# Patient Record
Sex: FEMALE | Race: WHITE | NOT HISPANIC OR LATINO | URBAN - METROPOLITAN AREA
[De-identification: names, ages, dates, MRNs, and addresses within clinical notes are randomized per-mention and may not be internally consistent; named-entity substitution may affect disease eponyms.]

---

## 2017-07-01 ENCOUNTER — OUTPATIENT (OUTPATIENT)
Dept: OUTPATIENT SERVICES | Facility: HOSPITAL | Age: 62
LOS: 1 days | End: 2017-07-01
Payer: MEDICAID

## 2017-07-14 DIAGNOSIS — R69 ILLNESS, UNSPECIFIED: ICD-10-CM

## 2017-09-01 PROCEDURE — G9001: CPT

## 2022-01-28 ENCOUNTER — EMERGENCY (EMERGENCY)
Facility: HOSPITAL | Age: 67
LOS: 1 days | Discharge: HOME | End: 2022-01-28
Attending: EMERGENCY MEDICINE | Admitting: EMERGENCY MEDICINE
Payer: MEDICARE

## 2022-01-28 VITALS
DIASTOLIC BLOOD PRESSURE: 99 MMHG | HEIGHT: 64 IN | WEIGHT: 149.91 LBS | SYSTOLIC BLOOD PRESSURE: 145 MMHG | RESPIRATION RATE: 18 BRPM | TEMPERATURE: 97 F | OXYGEN SATURATION: 97 % | HEART RATE: 92 BPM

## 2022-01-28 VITALS
DIASTOLIC BLOOD PRESSURE: 93 MMHG | SYSTOLIC BLOOD PRESSURE: 148 MMHG | RESPIRATION RATE: 16 BRPM | HEART RATE: 84 BPM | OXYGEN SATURATION: 97 %

## 2022-01-28 LAB
ALBUMIN SERPL ELPH-MCNC: 3.1 G/DL — LOW (ref 3.3–5)
ALP SERPL-CCNC: 89 U/L — SIGNIFICANT CHANGE UP (ref 40–120)
ALT FLD-CCNC: 25 U/L — SIGNIFICANT CHANGE UP (ref 10–45)
ANION GAP SERPL CALC-SCNC: 4 MMOL/L — LOW (ref 5–17)
AST SERPL-CCNC: 20 U/L — SIGNIFICANT CHANGE UP (ref 10–40)
BASOPHILS # BLD AUTO: 0.04 K/UL — SIGNIFICANT CHANGE UP (ref 0–0.2)
BASOPHILS NFR BLD AUTO: 0.7 % — SIGNIFICANT CHANGE UP (ref 0–2)
BILIRUB SERPL-MCNC: 0.2 MG/DL — SIGNIFICANT CHANGE UP (ref 0.2–1.2)
BUN SERPL-MCNC: 15 MG/DL — SIGNIFICANT CHANGE UP (ref 7–23)
CALCIUM SERPL-MCNC: 9.2 MG/DL — SIGNIFICANT CHANGE UP (ref 8.4–10.5)
CHLORIDE SERPL-SCNC: 107 MMOL/L — SIGNIFICANT CHANGE UP (ref 96–108)
CO2 SERPL-SCNC: 28 MMOL/L — SIGNIFICANT CHANGE UP (ref 22–31)
CREAT SERPL-MCNC: 0.62 MG/DL — SIGNIFICANT CHANGE UP (ref 0.5–1.3)
EOSINOPHIL # BLD AUTO: 0.17 K/UL — SIGNIFICANT CHANGE UP (ref 0–0.5)
EOSINOPHIL NFR BLD AUTO: 2.9 % — SIGNIFICANT CHANGE UP (ref 0–6)
ETHANOL SERPL-MCNC: <3 MG/DL — SIGNIFICANT CHANGE UP (ref 0–3)
GLUCOSE SERPL-MCNC: 109 MG/DL — HIGH (ref 70–99)
HCT VFR BLD CALC: 35.1 % — SIGNIFICANT CHANGE UP (ref 34.5–45)
HGB BLD-MCNC: 11.5 G/DL — SIGNIFICANT CHANGE UP (ref 11.5–15.5)
IMM GRANULOCYTES NFR BLD AUTO: 0.2 % — SIGNIFICANT CHANGE UP (ref 0–1.5)
LYMPHOCYTES # BLD AUTO: 1.34 K/UL — SIGNIFICANT CHANGE UP (ref 1–3.3)
LYMPHOCYTES # BLD AUTO: 23.2 % — SIGNIFICANT CHANGE UP (ref 13–44)
MCHC RBC-ENTMCNC: 27.6 PG — SIGNIFICANT CHANGE UP (ref 27–34)
MCHC RBC-ENTMCNC: 32.8 GM/DL — SIGNIFICANT CHANGE UP (ref 32–36)
MCV RBC AUTO: 84.2 FL — SIGNIFICANT CHANGE UP (ref 80–100)
MONOCYTES # BLD AUTO: 0.56 K/UL — SIGNIFICANT CHANGE UP (ref 0–0.9)
MONOCYTES NFR BLD AUTO: 9.7 % — SIGNIFICANT CHANGE UP (ref 2–14)
NEUTROPHILS # BLD AUTO: 3.66 K/UL — SIGNIFICANT CHANGE UP (ref 1.8–7.4)
NEUTROPHILS NFR BLD AUTO: 63.3 % — SIGNIFICANT CHANGE UP (ref 43–77)
NRBC # BLD: 0 /100 WBCS — SIGNIFICANT CHANGE UP (ref 0–0)
PLATELET # BLD AUTO: 303 K/UL — SIGNIFICANT CHANGE UP (ref 150–400)
POTASSIUM SERPL-MCNC: 4.1 MMOL/L — SIGNIFICANT CHANGE UP (ref 3.5–5.3)
POTASSIUM SERPL-SCNC: 4.1 MMOL/L — SIGNIFICANT CHANGE UP (ref 3.5–5.3)
PROT SERPL-MCNC: 6.1 G/DL — SIGNIFICANT CHANGE UP (ref 6–8.3)
RBC # BLD: 4.17 M/UL — SIGNIFICANT CHANGE UP (ref 3.8–5.2)
RBC # FLD: 14.1 % — SIGNIFICANT CHANGE UP (ref 10.3–14.5)
SARS-COV-2 RNA SPEC QL NAA+PROBE: SIGNIFICANT CHANGE UP
SODIUM SERPL-SCNC: 139 MMOL/L — SIGNIFICANT CHANGE UP (ref 135–145)
WBC # BLD: 5.78 K/UL — SIGNIFICANT CHANGE UP (ref 3.8–10.5)
WBC # FLD AUTO: 5.78 K/UL — SIGNIFICANT CHANGE UP (ref 3.8–10.5)

## 2022-01-28 PROCEDURE — 87635 SARS-COV-2 COVID-19 AMP PRB: CPT

## 2022-01-28 PROCEDURE — 80307 DRUG TEST PRSMV CHEM ANLYZR: CPT

## 2022-01-28 PROCEDURE — 99053 MED SERV 10PM-8AM 24 HR FAC: CPT

## 2022-01-28 PROCEDURE — 80053 COMPREHEN METABOLIC PANEL: CPT

## 2022-01-28 PROCEDURE — 99284 EMERGENCY DEPT VISIT MOD MDM: CPT

## 2022-01-28 PROCEDURE — 85025 COMPLETE CBC W/AUTO DIFF WBC: CPT

## 2022-01-28 PROCEDURE — 36415 COLL VENOUS BLD VENIPUNCTURE: CPT

## 2022-01-28 PROCEDURE — 99285 EMERGENCY DEPT VISIT HI MDM: CPT
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
## 2022-01-28 NOTE — ED ADULT NURSE NOTE - NSIMPLEMENTINTERV_GEN_ALL_ED
Implemented All Fall with Harm Risk Interventions:  [place] to call system. Call bell, personal items and telephone within reach. Instruct patient to call for assistance. Room bathroom lighting operational. Non-slip footwear when patient is off stretcher. Physically safe environment: no spills, clutter or unnecessary equipment. Stretcher in lowest position, wheels locked, appropriate side rails in place. Provide visual cue, wrist band, yellow gown, etc. Monitor gait and stability. Monitor for mental status changes and reorient to person, place, and time. Review medications for side effects contributing to fall risk. Reinforce activity limits and safety measures with patient and family. Provide visual clues: red socks.

## 2022-01-28 NOTE — ED ADULT NURSE REASSESSMENT NOTE - NS ED NURSE REASSESS COMMENT FT1
Patient BIBEMS for roaming around in the street with suitcase and belongings. Patient stated name was Kaia on arrival. Patient refusing to cooperate with staff and refusing answer staffs questions. Patient noted to be speaking quickly and erratically switching from past to present situations and stories. Patient denies visual/auditory hallucinations. Patient denies SI/HI.

## 2022-01-28 NOTE — ED PROVIDER NOTE - PATIENT PORTAL LINK FT
You can access the FollowMyHealth Patient Portal offered by [facility] by registering at the following website: [email or web address]. By joining [employer]’s FollowMyHealth portal, you will also be able to view your health information using other applications (apps) compatible with our system.

## 2022-01-28 NOTE — ED PROVIDER NOTE - OBJECTIVE STATEMENT
67 y/o F with no know history BIB police found wondering in parking lot , police complaints she felt confused , pt denies any alcohol use, denies any S/H ideation, c/o she is homeless and has no place to go

## 2022-01-28 NOTE — ED ADULT NURSE NOTE - OBJECTIVE STATEMENT
Patient BIBEMS for roaming in the street. As per EMS "patient was walking in the street with suitcase and belongings attempting to run away from family, confused and delusional". Patient noncooperative when asking orientation questions and refusing care.

## 2022-01-28 NOTE — ED PROVIDER NOTE - NSFOLLOWUPCLINICS_GEN_ALL_ED_FT
Family Practice Clinic  Family Medicine  [address] [place] [address]  Phone: [phone]  Fax:

## 2022-01-28 NOTE — ED ADULT NURSE REASSESSMENT NOTE - NS ED NURSE REASSESS COMMENT FT1
patient resting comfortably denies chest pain, SOB headache and dizziness. Patient still refusing IV. Will continue to monitor.

## 2022-01-28 NOTE — ED ADULT TRIAGE NOTE - DOMESTIC TRAVEL HIGH RISK QUESTION
CHIEF COMPLAINT: "I have fluid on my lungs"    HPI:  69 F with extensive stage SCLC (dx 2014, currently on tagrisso), sarcoidosis (she is unsure how she was diagnosed, but is on steroids for it, she thinks), h/o left pleural effusion s/p thoracentesis at [facility] in March, sent from [facility] rehab for "a large right pleural effusion."     The story is a little confusing as the patient is a little unclear with her history.  She states she was diagnosed with SCLC about four years ago and is followed by Dr. Estrella at [facility].  She is currently on "last stage" treatment due to her disease continuing to progress  Last month, she went to [facility] for weakness, tachycardia, and general medical workup, per her.  There, she was found to have a left pleural effusion that was drained.  She thinks it was malignant.  She was sent to a rehab facility ([facility]).  It's unclear why they checked imaging, but she had a cxr done that showed an effusion (unclear which side), so she was sent to the ER. She denies any sob or dyspnea.  Her main complaint was that she was constipated and felt bloated, which has improved now.  She can walk around the room without any difficulty.  She never complained of dyspnea.  No fevers, chills, hemoptysis, cough, wheezing.        PAST MEDICAL & SURGICAL HISTORY:  Sarcoidosis  Lung cancer  No significant past surgical history      FAMILY HISTORY:  Family history of thyroid cancer (Mother)  Family history of muscular dystrophy (Father)  Family history of Kaposi's sarcoma (Sibling)      SOCIAL HISTORY:  Smoking: former smoker      Allergies    No Known Allergies    Intolerances        HOME MEDICATIONS:  Home Medications:  cyanocobalamin 100 mcg oral tablet: 1 tab(s) orally once a day (27 Apr 2018 08:43)  famotidine 20 mg oral tablet: 1 tab(s) orally once a day (27 Apr 2018 08:43)  folic acid 1 mg oral tablet: 1 tab(s) orally once a day (27 Apr 2018 08:43)  Incruse Ellipta 62.5 mcg/inh inhalation powder: 1 puff(s) inhaled once a day (27 Apr 2018 08:43)  Lasix 20 mg oral tablet: 1 tab(s) orally once a day (27 Apr 2018 08:43)  Multiple Vitamins oral capsule: 1 cap(s) orally once a day (27 Apr 2018 08:43)  nystatin 100,000 units/g topical cream (obsolete): Apply topically to affected area 2 times a day (27 Apr 2018 08:43)  nystatin 100,000 units/g topical powder: Apply topically to affected area 2 times a day to b/l feet (27 Apr 2018 08:43)  predniSONE 10 mg oral tablet: 1 tab(s) orally once a day (27 Apr 2018 08:43)  Tagrisso 80 mg oral tablet: 1 tab(s) orally once a day (27 Apr 2018 08:43)  Tessalon 200 mg oral capsule: 1 cap(s) orally 3 times a day (27 Apr 2018 08:43)  Triple Paste topical ointment: Apply topically to affected area once a day (27 Apr 2018 08:43)  Tylenol 325 mg oral tablet: 2 tab(s) orally every 4 hours, As Needed (27 Apr 2018 08:43)  Vitamin C 500 mg oral tablet: 1 tab(s) orally once a day (27 Apr 2018 08:43)  zinc sulfate 220 mg oral tablet: 1 tab(s) orally once a day (27 Apr 2018 08:43)      REVIEW OF SYSTEMS:  Constitutional: [ x] negative [ ] fevers [ ] chills [ ] weight loss [ ] weight gain  HEENT: [x ] negative [ ] dry eyes [ ] eye irritation [ ] postnasal drip [ ] nasal congestion  CV: [x ] negative  [ ] chest pain [ ] orthopnea [ ] palpitations [ ] murmur  Resp: [ x] negative [ ] cough [ ] shortness of breath [ ] dyspnea [ ] wheezing [ ] sputum [ ] hemoptysis  GI: [x ] negative [ ] nausea [ ] vomiting [ ] diarrhea [ ] constipation [ ] abd pain [ ] dysphagia   : [ x] negative [ ] dysuria [ ] nocturia [ ] hematuria [ ] increased urinary frequency  Musculoskeletal: [x ] negative [ ] back pain [ ] myalgias [ ] arthralgias [ ] fracture  Skin: [x ] negative [ ] rash [ ] itch  Neurological: [ x] negative [ ] headache [ ] dizziness [ ] syncope [ ] weakness [ ] numbness  Psychiatric: [x ] negative [ ] anxiety [ ] depression  Endocrine: [x ] negative [ ] diabetes [ ] thyroid problem  Hematologic/Lymphatic: [x ] negative [ ] anemia [ ] bleeding problem  Allergic/Immunologic: [x ] negative [ ] itchy eyes [ ] nasal discharge [ ] hives [ ] angioedema  [ ] All other systems negative  [ ] Unable to assess ROS because ________    OBJECTIVE:  ICU Vital Signs Last 24 Hrs  T(C): 36.8 (27 Apr 2018 05:01), Max: 37.7 (26 Apr 2018 17:15)  T(F): 98.2 (27 Apr 2018 05:01), Max: 99.9 (26 Apr 2018 17:15)  HR: 97 (27 Apr 2018 05:01) (96 - 113)  BP: 105/71 (27 Apr 2018 05:01) (105/71 - 131/89)  BP(mean): --  ABP: --  ABP(mean): --  RR: 18 (27 Apr 2018 05:01) (18 - 24)  SpO2: 97% (27 Apr 2018 05:01) (94% - 97%) on RA        CAPILLARY BLOOD GLUCOSE          PHYSICAL EXAM:  GENERAL: NAD, well-developed, talking in full sentences  HEENT:  Atraumatic, Normocephalic  EYES: EOMI, PERRLA, conjunctiva and sclera clear  NECK: Supple, No JVD  CHEST/LUNG: minimally decreased breath sounds at bases bilaterally, else clear  HEART: Regular rate and rhythm; No murmurs, rubs, or gallops  ABDOMEN: Soft, Nontender, Nondistended; Bowel sounds present  EXTREMITIES:  2+ Peripheral Pulses, No clubbing, cyanosis, or edema  PSYCH: AAOx3  NEUROLOGY: non-focal exam  SKIN: No rashes or lesions    HOSPITAL MEDICATIONS:  Standing Meds:  ascorbic acid 500 milliGRAM(s) Oral daily  clotrimazole 1% Cream 1 Application(s) Topical two times a day  cyanocobalamin 100 MICROGram(s) Oral daily  famotidine    Tablet 20 milliGRAM(s) Oral daily  folic acid 1 milliGRAM(s) Oral daily  furosemide    Tablet 20 milliGRAM(s) Oral daily  multivitamin 1 Tablet(s) Oral daily  predniSONE   Tablet 10 milliGRAM(s) Oral daily  tiotropium 18 MICROgram(s) Capsule 1 Capsule(s) Inhalation daily  zinc sulfate 220 milliGRAM(s) Oral daily      PRN Meds:  benzonatate 100 milliGRAM(s) Oral three times a day PRN      LABS:                        15.2   5.68  )-----------( 217      ( 26 Apr 2018 17:24 )             46.6     Hgb Trend: 15.2<--  04-26    137  |  96<L>  |  12  ----------------------------<  130<H>  4.0   |  29  |  0.50    Ca    9.4      26 Apr 2018 17:24    TPro  7.0  /  Alb  3.2<L>  /  TBili  0.7  /  DBili  x   /  AST  21  /  ALT  20  /  AlkPhos  160<H>  04-26    Creatinine Trend: 0.50<--  PT/INR - ( 26 Apr 2018 17:24 )   PT: 12.2 SEC;   INR: 1.06          PTT - ( 26 Apr 2018 17:24 )  PTT:34.5 SEC      Venous Blood Gas:  04-26 @ 17:24  7.41/54/41/31/74.0  VBG Lactate: 2.0      MICROBIOLOGY:       RADIOLOGY:  [ x] Reviewed and interpreted by me  cta chest 4/26/18: no segmental PE; small-moderate R pleural effusion, small left pleural effusions, compressive atelectasis; narrowing of left bronchi with L hilar opacity    PULMONARY FUNCTION TESTS:    EKG: No

## 2022-01-28 NOTE — ED ADULT NURSE REASSESSMENT NOTE - NS ED NURSE REASSESS COMMENT FT1
patient calm and cooperative. VSS. some disorganized thinking. denies thoughts to harm herself or others. requesting assistance to get into a shelter

## 2022-02-01 ENCOUNTER — EMERGENCY (EMERGENCY)
Facility: HOSPITAL | Age: 67
LOS: 1 days | Discharge: HOME | End: 2022-02-01
Attending: EMERGENCY MEDICINE | Admitting: EMERGENCY MEDICINE
Payer: SELF-PAY

## 2022-02-01 VITALS
TEMPERATURE: 97 F | RESPIRATION RATE: 18 BRPM | OXYGEN SATURATION: 98 % | HEART RATE: 89 BPM | DIASTOLIC BLOOD PRESSURE: 75 MMHG | SYSTOLIC BLOOD PRESSURE: 145 MMHG | WEIGHT: 199.96 LBS | HEIGHT: 64 IN

## 2022-02-01 VITALS
OXYGEN SATURATION: 98 % | TEMPERATURE: 99 F | DIASTOLIC BLOOD PRESSURE: 75 MMHG | RESPIRATION RATE: 18 BRPM | HEART RATE: 73 BPM | SYSTOLIC BLOOD PRESSURE: 121 MMHG

## 2022-02-01 PROCEDURE — 99053 MED SERV 10PM-8AM 24 HR FAC: CPT

## 2022-02-01 PROCEDURE — 99282 EMERGENCY DEPT VISIT SF MDM: CPT

## 2022-02-01 PROCEDURE — 99283 EMERGENCY DEPT VISIT LOW MDM: CPT

## 2022-02-01 NOTE — ED PROVIDER NOTE - NSFOLLOWUPINSTRUCTIONS_ED_ALL_ED_FT
Follow Up as needed with your own doctor or with  Family Practice Clinic  Family Medicine  [address] [place] [address]  Phone: [phone]    contact DSS () for shelter and further assistance  General Information: [phone]    After-Hours Services: [phone]

## 2022-02-01 NOTE — ED PROVIDER NOTE - CLINICAL SUMMARY MEDICAL DECISION MAKING FREE TEXT BOX
pt bibems for sleeping at [facility]. pt requesting shelter for the night. denies any complaints. benign exam. already seen by SW 2 d ago.  will give shelter in ED overnight pt bibems for sleeping at [facility]. pt requesting shelter for the night. denies any complaints. benign exam. already seen by SW 2 d ago.  will give shelter in ED overnight    Pt did not want to go to shelter. Will d/c = patient will be given info for .

## 2022-02-01 NOTE — ED ADULT NURSE NOTE - OBJECTIVE STATEMENT
Pr brought in by EMS. EMS states that patient was found sleeping in one of the aisles at [facility]. EMS states they brought her to ED due to lack of other place to bring her. Pt is verbal, but states "my name is Lisbeth." Inappropriate speech. Pt does not appear to be in pain at this time. Pt cooperates with directions for the most part. EMS believes patient to be homeless.

## 2022-02-01 NOTE — ED PROVIDER NOTE - OBJECTIVE STATEMENT
pt bibems found sleeping at [facility]. pt states she is homeless and just wants a place to sleep. denies medical complaints. no si/hi/hallucinations. was seen here for same 1/28. was seen by SW and given ride to [facility] per pt request

## 2022-02-01 NOTE — ED ADULT NURSE NOTE - NSIMPLEMENTINTERV_GEN_ALL_ED
Implemented All Fall Risk Interventions:  [place] to call system. Call bell, personal items and telephone within reach. Instruct patient to call for assistance. Room bathroom lighting operational. Non-slip footwear when patient is off stretcher. Physically safe environment: no spills, clutter or unnecessary equipment. Stretcher in lowest position, wheels locked, appropriate side rails in place. Provide visual cue, wrist band, yellow gown, etc. Monitor gait and stability. Monitor for mental status changes and reorient to person, place, and time. Review medications for side effects contributing to fall risk. Reinforce activity limits and safety measures with patient and family.

## 2022-02-01 NOTE — ED ADULT TRIAGE NOTE - CHIEF COMPLAINT QUOTE
We found her outside the [facility], she is homeless" as per EMS We found her outside the [facility], she is homeless" as per EMS (ISAR Negative)

## 2022-02-01 NOTE — ED PROVIDER NOTE - PATIENT PORTAL LINK FT
You can access the FollowMyHealth Patient Portal offered by [facility] by registering at the following website: [email or web address]. By joining [employer]’s FollowMyHealth portal, you will also be able to view your health information using other applications (apps) compatible with our system.

## 2022-02-01 NOTE — ED ADULT NURSE REASSESSMENT NOTE - NS ED NURSE REASSESS COMMENT FT1
Unable to complete med list, med hx, or surg hx. When patient is asked about her health information, she responds with inappropriate words; for example, "my name is Sabattus. you need a sandwich? you can have my sandwich."

## 2022-02-05 ENCOUNTER — EMERGENCY (EMERGENCY)
Facility: HOSPITAL | Age: 67
LOS: 1 days | Discharge: HOME | End: 2022-02-05
Attending: EMERGENCY MEDICINE | Admitting: EMERGENCY MEDICINE
Payer: SELF-PAY

## 2022-02-05 VITALS
HEART RATE: 85 BPM | OXYGEN SATURATION: 97 % | SYSTOLIC BLOOD PRESSURE: 132 MMHG | HEIGHT: 64 IN | RESPIRATION RATE: 18 BRPM | DIASTOLIC BLOOD PRESSURE: 74 MMHG | WEIGHT: 160.06 LBS | TEMPERATURE: 98 F

## 2022-02-05 VITALS — HEART RATE: 80 BPM | OXYGEN SATURATION: 98 % | TEMPERATURE: 99 F | RESPIRATION RATE: 17 BRPM

## 2022-02-05 PROCEDURE — 99283 EMERGENCY DEPT VISIT LOW MDM: CPT

## 2022-02-05 PROCEDURE — 99053 MED SERV 10PM-8AM 24 HR FAC: CPT

## 2022-02-05 PROCEDURE — 99285 EMERGENCY DEPT VISIT HI MDM: CPT
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
## 2022-02-05 NOTE — ED ADULT TRIAGE NOTE - CADM TRG TX PRIOR TO ARRIVAL
Patient Seen in: [facility] In [address] [place] [address]      History   Patient presents with:  Abdomen/Flank Pain    Stated Complaint: stomach pain    HPI    The patient is a 27-year-old female with past history of diabetes, hypertension who presen rebound. Neurologic: Patient is awake, alert and oriented ×3.   The patient's motor strength is 5 out of 5 and symmetric in the upper and lower extremities bilaterally  Extremities: No focal swelling or tenderness  Skin: No pallor, no redness or warmth to none

## 2022-02-05 NOTE — ED PROVIDER NOTE - NSFOLLOWUPINSTRUCTIONS_ED_ALL_ED_FT
Follow Up as needed with your own doctor or with  Family Practice Clinic  Family Medicine  [address] [place] [address]  Phone: [phone]    contact DSS (Department of ) for help, shelter:    [facility] [address] [address]    On-line Directions        General Information: [phone]    After-Hours Services: [phone]

## 2022-02-05 NOTE — ED PROVIDER NOTE - PATIENT PORTAL LINK FT
You can access the FollowMyHealth Patient Portal offered by [facility] by registering at the following website: [email or web address]. By joining [employer]’s FollowMyHealth portal, you will also be able to view your health information using other applications (apps) compatible with our system.

## 2022-02-05 NOTE — ED ADULT NURSE REASSESSMENT NOTE - NS ED NURSE REASSESS COMMENT FT1
Covering RN; as per primary RN Sadi, pt to wait for social work.  Pt refusing social work follow up at this time.  Pt signed discharge paperwork and walked out of ED with all belongings, stating that "I might wait in the waiting room."  Primary RN notified.

## 2022-02-05 NOTE — ED ADULT NURSE NOTE - OBJECTIVE STATEMENT
Patient presented to ED after being found wandering around [facility], police picked up and brought her to the ED. Patient homeless. No complaints of SOB, discomfort or pain. Alert and oriented to person, place and situation.

## 2022-02-05 NOTE — ED ADULT NURSE REASSESSMENT NOTE - NS ED NURSE REASSESS COMMENT FT1
Patient slept well through the night, no signs or symptoms of SOB, discomfort or pain. Alert and oriented to person, place and situation.
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
## 2022-02-05 NOTE — ED PROVIDER NOTE - CONSTITUTIONAL, MLM
normal... Well appearing, awake, alert, oriented to person, place, time/situation and in no apparent distress. disheveled

## 2022-02-10 ENCOUNTER — EMERGENCY (EMERGENCY)
Facility: HOSPITAL | Age: 67
LOS: 1 days | Discharge: HOME | End: 2022-02-10
Attending: EMERGENCY MEDICINE | Admitting: EMERGENCY MEDICINE
Payer: SELF-PAY

## 2022-02-10 VITALS
TEMPERATURE: 97 F | SYSTOLIC BLOOD PRESSURE: 144 MMHG | WEIGHT: 119.93 LBS | HEART RATE: 85 BPM | DIASTOLIC BLOOD PRESSURE: 86 MMHG | RESPIRATION RATE: 18 BRPM | HEIGHT: 64 IN | OXYGEN SATURATION: 97 %

## 2022-02-10 PROCEDURE — 99053 MED SERV 10PM-8AM 24 HR FAC: CPT

## 2022-02-10 PROCEDURE — 99282 EMERGENCY DEPT VISIT SF MDM: CPT

## 2022-02-10 PROCEDURE — 99283 EMERGENCY DEPT VISIT LOW MDM: CPT

## 2022-02-11 VITALS
SYSTOLIC BLOOD PRESSURE: 155 MMHG | RESPIRATION RATE: 18 BRPM | TEMPERATURE: 98 F | HEART RATE: 70 BPM | OXYGEN SATURATION: 99 % | DIASTOLIC BLOOD PRESSURE: 78 MMHG

## 2022-02-11 NOTE — ED ADULT NURSE REASSESSMENT NOTE - NS ED NURSE REASSESS COMMENT FT1
Received pt in bed, awake alert oriented. Awaiting breakfast. OOB to BR. Discussed social work consultation and placement in Housing vs. Group home.

## 2022-02-11 NOTE — ED PROVIDER NOTE - CLINICAL SUMMARY MEDICAL DECISION MAKING FREE TEXT BOX
pt is elderly female who lives on street, has been to ED many times in past few days, she has been offered SW many times but she refuses to go shelter

## 2022-02-11 NOTE — ED PROVIDER NOTE - NSICDXPASTMEDICALHX_GEN_ALL_CORE_FT
PAST MEDICAL HISTORY:  CAD (coronary artery disease)      Xolair Counseling:  Patient informed of potential adverse effects including but not limited to fever, muscle aches, rash and allergic reactions.  The patient verbalized understanding of the proper use and possible adverse effects of Xolair.  All of the patient's questions and concerns were addressed.

## 2022-02-11 NOTE — ED ADULT NURSE NOTE - EXTENSIONS OF SELF_ADULT
Correct dose escribed to pharmacy.    
Current sig: Take 1 tablet three times daily       Spoke with [facility], asking if patient really should be taking it three times daily? Is it for erectile dysfunction?    
It was put in wrong  It is qday prn ED  
[facility] [place] needing to clarify the dosing  for the Sildenafil.  [phone]  
None

## 2022-02-11 NOTE — ED PROVIDER NOTE - NSFOLLOWUPINSTRUCTIONS_ED_ALL_ED_FT
Pt did not want to go to shelter. Will d/c = patient will be given info for .    Follow Up as needed with your own doctor or with  [facility]  Family Medicine  [address] [place] [address]  Phone: [phone]    contact DSS () for shelter and further assistance  General Information: [phone]    After-Hours Services: [phone]

## 2022-02-11 NOTE — ED ADULT NURSE NOTE - ED STAT RN HANDOFF DETAILS
handoff report given to Nichol ROSAS. patient slept comfortably. no other concerns/complaints offered. assisted as needed. safety maintained.

## 2022-02-11 NOTE — ED PROVIDER NOTE - PATIENT PORTAL LINK FT
- - -
You can access the FollowMyHealth Patient Portal offered by [facility] by registering at the following website: [email or web address]. By joining [employer]’s FollowMyHealth portal, you will also be able to view your health information using other applications (apps) compatible with our system.

## 2022-02-11 NOTE — ED ADULT NURSE REASSESSMENT NOTE - NS ED NURSE REASSESS COMMENT FT1
0010-received patient in stretcher BIBA from [facility]. AOX3. ambulatory. as per patient she was trying to get a ride/cab to come to the ED so she can sit in the lobby or waiting area. But the police called an ambulance and brought her here. patient talks to herself. gets irritated and upset when asked assessment questions. refused to stay on the stretcher at first. asking "how much do I have yo pay, I just want to sit outside". safety in place. nursing care ongoing.

## 2022-02-11 NOTE — ED PROVIDER NOTE - OBJECTIVE STATEMENT
67 y/o F , homeless, found wondering near [facility] , this is almost 6 th ed visit, multiple time she was given  help but then she wants to go [facility] and from there she leaves, she does not go to shelter.

## 2022-02-15 ENCOUNTER — EMERGENCY (EMERGENCY)
Facility: HOSPITAL | Age: 67
LOS: 1 days | Discharge: HOME | End: 2022-02-15
Attending: EMERGENCY MEDICINE | Admitting: EMERGENCY MEDICINE
Payer: SELF-PAY

## 2022-02-15 PROCEDURE — 99053 MED SERV 10PM-8AM 24 HR FAC: CPT

## 2022-02-15 PROCEDURE — 99283 EMERGENCY DEPT VISIT LOW MDM: CPT

## 2022-02-16 VITALS
WEIGHT: 119.93 LBS | HEIGHT: 64 IN | HEART RATE: 81 BPM | RESPIRATION RATE: 21 BRPM | OXYGEN SATURATION: 97 % | SYSTOLIC BLOOD PRESSURE: 132 MMHG | TEMPERATURE: 98 F | DIASTOLIC BLOOD PRESSURE: 93 MMHG

## 2022-02-16 PROCEDURE — 99283 EMERGENCY DEPT VISIT LOW MDM: CPT

## 2022-02-16 NOTE — ED PROVIDER NOTE - OBJECTIVE STATEMENT
67 y/o F , homeless, found wondering near [facility] , this is almost 6 th ed visit, multiple time she was given  help but then she wants to go [facility] and from there she leaves, she does not go to shelter.

## 2022-02-16 NOTE — ED PROVIDER NOTE - CLINICAL SUMMARY MEDICAL DECISION MAKING FREE TEXT BOX
pt is elderly homeless female , with frequent visits to Ed because she keep wondering near mall, she has been offered  help many times but she keeps refusing to go shelter,

## 2022-02-16 NOTE — ED ADULT NURSE NOTE - OBJECTIVE STATEMENT
pt jaz picked up at [facility]  upon arrival pt talking to herself and having difficuly answering questions she gets off track and starts talking about  issues that are not part of the current conversation

## 2022-02-16 NOTE — ED ADULT TRIAGE NOTE - HEIGHT IN INCHES
After Visit Summary   3/19/2018    Angi Ramos    MRN: [id number]           Patient Information     Date Of Birth          1959        Visit Information        Provider Department      3/19/2018 10:00 AM Jeannette Chavez NP [facility]        Today's Diagnoses     Routine general medical examination at a health care facility    -  1      Care Instructions      Preventive Health Recommendations  Female Ages 50 - 64    Yearly exam: See your health care provider every year in order to  o Review health changes.   o Discuss preventive care.    o Review your medicines if your doctor has prescribed any.      Get a Pap test every three years (unless you have an abnormal result and your provider advises testing more often).    If you get Pap tests with HPV test, you only need to test every 5 years, unless you have an abnormal result.     You do not need a Pap test if your uterus was removed (hysterectomy) and you have not had cancer.    You should be tested each year for STDs (sexually transmitted diseases) if you're at risk.     Have a mammogram every 1 to 2 years.    Have a colonoscopy at age 50, or have a yearly FIT test (stool test). These exams screen for colon cancer.      Have a cholesterol test every 5 years, or more often if advised.    Have a diabetes test (fasting glucose) every three years. If you are at risk for diabetes, you should have this test more often.     If you are at risk for osteoporosis (brittle bone disease), think about having a bone density scan (DEXA).    Shots: Get a flu shot each year. Get a tetanus shot every 10 years.    Nutrition:     Eat at least 5 servings of fruits and vegetables each day.    Eat whole-grain bread, whole-wheat pasta and brown rice instead of white grains and rice.    Talk to your provider about Calcium and Vitamin D.     Lifestyle    Exercise at least 150 minutes a week (30 minutes a day, 5 days a week). This will help you  "control your weight and prevent disease.    Limit alcohol to one drink per day.    No smoking.     Wear sunscreen to prevent skin cancer.     See your dentist every six months for an exam and cleaning.    See your eye doctor every 1 to 2 years.            Follow-ups after your visit        Who to contact     If you have questions or need follow up information about today's clinic visit or your schedule please contact [facility] directly at [phone].  Normal or non-critical lab and imaging results will be communicated to you by MyChart, letter or phone within 4 business days after the clinic has received the results. If you do not hear from us within 7 days, please contact the clinic through MyChart or phone. If you have a critical or abnormal lab result, we will notify you by phone as soon as possible.  Submit refill requests through [employer] or call your pharmacy and they will forward the refill request to us. Please allow 3 business days for your refill to be completed.          Additional Information About Your Visit        Care EveryWhere ID     This is your Care EveryWhere ID. This could be used by other organizations to access your [facility] medical records  [id number]        Your Vitals Were     Pulse Temperature Respirations Height Last Period Pulse Oximetry    66 98.5  F (36.9  C) (Oral) 16 5' 5\" (1.651 m) 10/10/2016 97%    Breastfeeding? BMI (Body Mass Index)                No 21.63 kg/m2           Blood Pressure from Last 3 Encounters:   03/19/18 128/76   01/08/18 135/77   02/10/17 130/79    Weight from Last 3 Encounters:   03/19/18 130 lb (59 kg)   01/08/18 124 lb 12.8 oz (56.6 kg)   02/10/17 125 lb (56.7 kg)              We Performed the Following     CBC with platelets     Comprehensive metabolic panel     Hepatitis C antibody     Lipid panel reflex to direct LDL Fasting     TSH with free T4 reflex        Primary Care Provider Office Phone # Fax #    Dev Joyner, " [phone]       [address] [facility] [address]  [facility] [address]        Equal Access to Services     VLAD UGALDE : Ximena Cifuentes, oliverio acevedo, felisaadia mansfieldesdrasalex fordjennialex, waxmiles hueyin hayaashahida fordbobbi lopez laGunjancharu person. So [facility] [phone].    ATENCIÓN: Si habla español, tiene a avila disposición servicios gratuitos de asistencia lingüística. Llame al [phone].    We comply with applicable federal civil rights laws and [place] laws. We do not discriminate on the basis of race, color, national origin, age, disability, sex, sexual orientation, or gender identity.            Thank you!     Thank you for choosing [facility]  for your care. Our goal is always to provide you with excellent care. Hearing back from our patients is one way we can continue to improve our services. Please take a few minutes to complete the written survey that you may receive in the mail after your visit with us. Thank you!             Your Updated Medication List - Protect others around you: Learn how to safely use, store and throw away your medicines at www.disposemymeds.org.      Notice  As of 3/19/2018 10:57 AM    You have not been prescribed any medications.       4

## 2022-02-16 NOTE — ED PROVIDER NOTE - PATIENT PORTAL LINK FT
You can access the FollowMyHealth Patient Portal offered by [facility] by registering at the following website: [email or web address]. By joining [employer]’s FollowMyHealth portal, you will also be able to view your health information using other applications (apps) compatible with our system.

## 2022-02-16 NOTE — ED PROVIDER NOTE - NSFOLLOWUPINSTRUCTIONS_ED_ALL_ED_FT
· Follow-up Instructions (will be supplied to the patient only if discharged)	Pt did not want to go to shelter. Will d/c = patient will be given info for .    Follow Up as needed with your [place] [address]  Phone: [phone]    contact DSS () for shelter and further assistance  General Information: [phone]    After-Hours Services: [phone]

## 2022-02-16 NOTE — ED ADULT TRIAGE NOTE - CHIEF COMPLAINT QUOTE
pt jaz picked up at [facility]  upon arrival pt talking to herself and having difficuly answering questions she gets off track and statrts talking about  isuues that are not part of the current conversation

## 2022-02-16 NOTE — ED ADULT NURSE NOTE - CHIEF COMPLAINT QUOTE
pt jaz picked up at [facility]  upon arrival pt talking to herself and having difficuly answering questions she gets off track and starts talking about  issues that are not part of the current conversation

## 2022-02-19 ENCOUNTER — EMERGENCY (EMERGENCY)
Facility: HOSPITAL | Age: 67
LOS: 1 days | Discharge: HOME | End: 2022-02-19
Attending: STUDENT IN AN ORGANIZED HEALTH CARE EDUCATION/TRAINING PROGRAM | Admitting: STUDENT IN AN ORGANIZED HEALTH CARE EDUCATION/TRAINING PROGRAM
Payer: SELF-PAY

## 2022-02-19 VITALS
RESPIRATION RATE: 17 BRPM | HEART RATE: 79 BPM | SYSTOLIC BLOOD PRESSURE: 139 MMHG | OXYGEN SATURATION: 98 % | TEMPERATURE: 98 F | DIASTOLIC BLOOD PRESSURE: 91 MMHG

## 2022-02-19 VITALS — WEIGHT: 110.01 LBS | HEIGHT: 64 IN

## 2022-02-19 PROCEDURE — 99283 EMERGENCY DEPT VISIT LOW MDM: CPT

## 2022-02-19 PROCEDURE — 99282 EMERGENCY DEPT VISIT SF MDM: CPT

## 2022-02-19 PROCEDURE — 99053 MED SERV 10PM-8AM 24 HR FAC: CPT

## 2022-02-19 NOTE — ED PROVIDER NOTE - PATIENT PORTAL LINK FT
You can access the FollowMyHealth Patient Portal offered by [facility] by registering at the following website: [email or web address]. By joining [employer]’s FollowMyHealth portal, you will also be able to view your health information using other applications (apps) compatible with our system.

## 2022-02-19 NOTE — ED PROVIDER NOTE - PROGRESS NOTE DETAILS
Shoshana Castro MD, Attending  As per Jan 28  note, pt needs psych clearance prior to shelter placement. labs for med clearance ordered. Pt now declining to see social work, states "it was cold last night". Pt moving all extremities, got dressed without assistance, well appearing, in no acute distress. labs cancelled.

## 2022-02-19 NOTE — ED PROVIDER NOTE - NSFOLLOWUPINSTRUCTIONS_ED_ALL_ED_FT
LUQ tube check completed  To be rechecked in 1 week  Still having purulent drainage in bulb and cavity still present  You were seen by the  in the emergency room and given shelter information.      ** Go to the nearest Emergency Department if you experience any new or concerning symptoms, such as:   - worsening pain  - chest pain  - difficulty breathing  - passing out  - unable to eat or drink  - unable to move or feel part of your body  - fever, chills ** Go to the nearest Emergency Department if you experience any new or concerning symptoms, such as:   - worsening pain  - chest pain  - difficulty breathing  - passing out  - unable to eat or drink  - unable to move or feel part of your body  - fever, chills

## 2022-02-19 NOTE — ED ADULT NURSE NOTE - OBJECTIVE STATEMENT
Patient presented to ER with request for social work and possibly placement. No signs or symptoms of SOB, discomfort or pain. Patient refusing vital signs.

## 2022-02-19 NOTE — ED PROVIDER NOTE - CLINICAL SUMMARY MEDICAL DECISION MAKING FREE TEXT BOX
67 yo homeless female with frequent ED visits, asking to see  in the AM. Pt well appearing, moving all extremities, no distress, no medical complaints. SW in the AM and DC.

## 2022-02-19 NOTE — ED PROVIDER NOTE - OBJECTIVE STATEMENT
65 yo F pw request to see . Pt is homeless, 6th ed visit this month, pt has no medical complaints, states she wants to sleep and see  for placement in shelter. Pt not cooperating with history taking or exam, states "I want to rest" 65 yo F pw request to see . Pt is homeless, 6th ed visit this month, pt has no medical complaints, states she wants to sleep and see  for placement in shelter. Pt not cooperating with history taking or exam, states "I want to rest". Refusing labs and vital signs

## 2022-02-19 NOTE — ED PROVIDER NOTE - PHYSICAL EXAMINATION
Gen: appears unkempt but clinically well appearing in NAD   Head: NC/AT  Neck: trachea midline  Resp:  No distress  Ext: no deformities  Neuro:  A&O appears non focal  Skin:  Warm and dry as visualized  Psych:  Normal affect and mood

## 2022-02-19 NOTE — ED ADULT NURSE NOTE - NS ED NURSE DC INFO COMPLEXITY
[Dear  ___] : Dear  [unfilled], [Consult Letter:] : I had the pleasure of evaluating your patient, [unfilled]. [Please see my note below.] : Please see my note below. [Consult Closing:] : Thank you very much for allowing me to participate in the care of this patient.  If you have any questions, please do not hesitate to contact me. [Sincerely,] : Sincerely, [FreeTextEntry3] : Sandy Morgan MD Simple: Patient demonstrates quick and easy understanding

## 2022-04-07 NOTE — ED PROVIDER NOTE - MUSCULOSKELETAL, MLM
Last visit: 8/30/21  Next visit: none scheduled    Last labs   GOT/AST (Units/L)   Date Value   03/11/2022 20     GPT/ALT (Units/L)   Date Value   03/11/2022 36     Creatinine (mg/dL)   Date Value   03/11/2022 0.94     CE checked:   Medication: prednisone  Last Refill: 8/30/21; needs fu; warning given  
Spine appears normal, range of motion is not limited, no muscle or joint tenderness

## 2022-04-15 ENCOUNTER — EMERGENCY (EMERGENCY)
Facility: HOSPITAL | Age: 67
LOS: 1 days | Discharge: HOME | End: 2022-04-15
Attending: EMERGENCY MEDICINE | Admitting: EMERGENCY MEDICINE
Payer: SELF-PAY

## 2022-04-15 VITALS
RESPIRATION RATE: 18 BRPM | SYSTOLIC BLOOD PRESSURE: 136 MMHG | DIASTOLIC BLOOD PRESSURE: 86 MMHG | OXYGEN SATURATION: 97 % | HEART RATE: 81 BPM

## 2022-04-15 VITALS
RESPIRATION RATE: 18 BRPM | SYSTOLIC BLOOD PRESSURE: 147 MMHG | WEIGHT: 149.91 LBS | HEART RATE: 72 BPM | TEMPERATURE: 97 F | HEIGHT: 64 IN | OXYGEN SATURATION: 96 % | DIASTOLIC BLOOD PRESSURE: 76 MMHG

## 2022-04-15 PROCEDURE — 99283 EMERGENCY DEPT VISIT LOW MDM: CPT

## 2022-04-15 PROCEDURE — 99053 MED SERV 10PM-8AM 24 HR FAC: CPT

## 2022-04-15 PROCEDURE — 99282 EMERGENCY DEPT VISIT SF MDM: CPT

## 2022-04-15 NOTE — ED ADULT NURSE REASSESSMENT NOTE - NS ED NURSE REASSESS COMMENT FT1
pt verbalizes that she wants to leave, does not want to wait for SW consultation. Pt has had multiple efforts in the past with SW help. Pt refusing. MD Lorna aware, plan for discharge. Pt in no apparent distress.

## 2022-04-15 NOTE — ED ADULT NURSE NOTE - NS ED NOTE  TALK SOMEONE YN
Discharge Planning Assessment  [facility]     Patient Name: Serina Powell  MRN: [id number]  Today's Date: 2/11/2021    Admit Date: 2/9/2021    Discharge Needs Assessment     Row Name 02/11/21 0907       Living Environment    Lives With  child(satnam), adult    Current Living Arrangements  home/apartment/condo    Primary Care Provided by  self    Provides Primary Care For  no one    Family Caregiver if Needed  child(satnam), adult    Quality of Family Relationships  helpful    Able to Return to Prior Arrangements  yes       Resource/Environmental Concerns    Resource/Environmental Concerns  none    Transportation Concerns  car, none       Transition Planning    Patient/Family Anticipates Transition to  home with family    Patient/Family Anticipated Services at Transition  none    Transportation Anticipated  family or friend will provide       Discharge Needs Assessment    Readmission Within the Last 30 Days  no previous admission in last 30 days    Equipment Currently Used at Home  cane, straight;glucometer    Concerns to be Addressed  discharge planning    Concerns Comments  Feel that this patient would benefit from Home Health.    Anticipated Changes Related to Illness  inability to care for self    Equipment Needed After Discharge  none    Outpatient/Agency/Support Group Needs  skilled nursing facility;homecare agency    Provided Post Acute Provider List?  Yes    Post Acute Provider List  Home Health    Delivered To  Patient    Method of Delivery  Telephone    Patient's Choice of Community Agency(s)  [facility] or [facility]    Discharge Coordination/Progress  New referral to [facility].        Discharge Plan     Row Name 02/11/21 0913       Plan    Plan  New referral to [facility] (Wilma notified, patient needs PT/OT). Caretenders undable to take.    Patient/Family in Agreement with Plan  yes    Plan Comments  Spoke with patient via telephone. She lives with her daughter and granddaughter. She states that she is  independent with her bathing and dressing. Needs assistance with keeping house. Patient has advanced house calls for her PCP. Barriers to discharge: none.        Continued Care and Services - Admitted Since 2/9/2021     Home Medical Care     Service Provider Request Status Selected Services Address Phone Fax Patient Preferred    [facility]  Pending - Request Sent N/A [address] [place] [address] [phone] --    [facility]  Declined  unable to take due to billing N/A [address] [facility] [place] [address] [phone] -- --              Expected Discharge Date and Time     Expected Discharge Date Expected Discharge Time    Feb 11, 2021         Demographic Summary     Row Name 02/11/21 0903       General Information    Admission Type  observation    Arrived From  emergency department    Referral Source  admission list    Reason for Consult  discharge planning    Preferred Language  English     Used During This Interaction  no       Contact Information    Permission Granted to Share Info With          Functional Status     Row Name 02/11/21 0905       Functional Status    Usual Activity Tolerance  good    Current Activity Tolerance  good       Functional Status, IADL    Medications  independent    Meal Preparation  assistive person    Housekeeping  assistive person    Laundry  assistive person    Shopping  assistive person    IADL Comments  Patient is able to complete her own adl's independently, according her.       Employment/    Employment Status  disabled                 Phone communication or documentation only - no physical contact with patient or family.        Anna Naegele RN Case Manager  [facility]  [address] [place]  [address] [phone]  office  [phone]  fax  [email or web address]  [facility]          Anna L Naegele, RN     No

## 2022-04-15 NOTE — ED PROVIDER NOTE - PATIENT PORTAL LINK FT
You can access the FollowMyHealth Patient Portal offered by [facility] by registering at the following website: [email or web address]. By joining [employer]’s FollowMyHealth portal, you will also be able to view your health information using other applications (apps) compatible with our system.

## 2022-04-15 NOTE — ED PROVIDER NOTE - OBJECTIVE STATEMENT
66F brought in by EMS because police picked her up [facility]. Pt states she is homeless and she missed the last train. She said she was not bothering anyone but they called the police on  her. Pt has no medical complaints, states she wants to sleep and catch the train in the morning. She is open to see  for placement in shelter. Pt refuses taking meds at home but says she wants to rest. 66F brought in by EMS because police picked her up [facility]. Pt states she is homeless and she missed the last train. She said she was not bothering anyone but they called the police on  her. Pt has no medical complaints, states she wants to sleep and catch the train in the morning.  Pt refuses taking meds at home but says she wants to rest here. She said she was going to come here anyway because she wanted to see a  regarding housing. It was a warm night and she wouldn't have come but since the police brought her, then she will speak with someone while here.

## 2022-04-15 NOTE — ED ADULT NURSE NOTE - OBJECTIVE STATEMENT
Pt is alert, brought to the ER by EMS after [facility] staff called Police due to patient's erratic behavior at the Pharmacy. Pt denies any chest pain , SOB or cough, fever. Pt came with 3 Bags of personal belongings.

## 2022-04-15 NOTE — ED PROVIDER NOTE - PROGRESS NOTE DETAILS
Dr. Rothman: Recd sign out from Dr. Mc, pt with multiple ED visits for homelessness, has spoken to SW in the past, initially requesting the same however now wants to be discharged, states has a place to go, has no complaints. valeria CASTRO.

## 2022-04-15 NOTE — ED PROVIDER NOTE - CLINICAL SUMMARY MEDICAL DECISION MAKING FREE TEXT BOX
66F brought in by EMS because police picked her up [facility]. Pt states she is homeless and she missed the last train. She said she was not bothering anyone but they called the police on  her. Pt has no medical complaints, states she wants to sleep and catch the train in the morning. She is open to see  for placement in shelter. Pt refuses taking meds at home but says she wants to rest.   Exam as stated. Will allow pt safety and warmth to sleep until the AM. 66F brought in by EMS because police picked her up [facility]. Pt states she is homeless and she missed the last train. She said she was not bothering anyone but they called the police on  her. Pt has no medical complaints, states she wants to sleep and catch the train in the morning.  Pt refuses taking meds at home but says she wants to rest here. She said she was going to come here anyway because she wanted to see a  regarding housing. It was a warm night and she wouldn't have come but since the police brought her, then she will speak with someone while here.   Exam as stated. Will allow pt safety and warmth to sleep until the AM.  Pt cooperative through the night. Resting comfortably.   Patient signed out to incoming physician.  All decisions regarding the progression of care will be made at their discretion.

## 2022-04-15 NOTE — ED PROVIDER NOTE - NSFOLLOWUPCLINICS_GEN_ALL_ED_FT
Family Practice Clinic  Family Medicine  [address] [place] [id number]  Phone: [phone]  Fax:

## 2022-10-28 NOTE — ED ADULT NURSE NOTE - CAS TRG GEN SKIN COLOR
Normal for race

## 2023-01-19 NOTE — ED PROVIDER NOTE - NS ED ATTENDING STATEMENT MOD
Attending Only
You can access the FollowMyHealth Patient Portal offered by [facility] by registering at the following website: [email or web address]. By joining [employer]’s FollowMyHealth portal, you will also be able to view your health information using other applications (apps) compatible with our system.

## 2024-06-18 NOTE — ED PROVIDER NOTE - PSYCHIATRIC, MLM
Juan Broussard (:  Yaquelin Henriquez MD, Hematology/Oncology, [place]    Screening for HIV (human immunodeficiency virus)  -     HIV Screen; Future    Need for hepatitis C screening test  -     Hepatitis C Antibody; Future    Screening for colon cancer  -     Varicella Zoster Antibody, IgG; Future  -     Hepatitis B Surface Antibody; Future    Other fatigue  -Stable  -Blood work ordered  -     CBC with Auto Differential; Future  -     Vitamin D 25 Hydroxy; Future  -     Vitamin B12 & Folate; Future  -     Iron and TIBC; Future  -     Ferritin; Future    Iron deficiency anemia due to chronic blood loss  -Looks to be improved according to most recent blood work  -History of  Alert and oriented to person, place, time/situation. normal mood and affect. no apparent risk to self or others.

## 2025-01-03 NOTE — ED PROVIDER NOTE - CROS ED RESP ALL NEG
The office order for PCP removal request is Approved and finalized on January 3, 2025.    Removed Rojas Bell DO as the patient's Primary Care Physician     negative...

## 2025-07-24 NOTE — ED ADULT NURSE NOTE - CAS DISCH TRANSFER METHOD
Patient showed up at the urgent care at [place] and advised she needed no her visit 2 weeks ago.  Note printed for the patient to handed off to her   
Transportation service